# Patient Record
Sex: FEMALE | Race: OTHER | NOT HISPANIC OR LATINO | URBAN - METROPOLITAN AREA
[De-identification: names, ages, dates, MRNs, and addresses within clinical notes are randomized per-mention and may not be internally consistent; named-entity substitution may affect disease eponyms.]

---

## 2017-04-01 ENCOUNTER — INPATIENT (INPATIENT)
Facility: HOSPITAL | Age: 22
LOS: 1 days | Discharge: HOME | End: 2017-04-03
Attending: OBSTETRICS & GYNECOLOGY | Admitting: OBSTETRICS & GYNECOLOGY
Payer: MEDICAID

## 2017-04-01 VITALS
RESPIRATION RATE: 18 BRPM | TEMPERATURE: 98 F | HEART RATE: 86 BPM | DIASTOLIC BLOOD PRESSURE: 55 MMHG | SYSTOLIC BLOOD PRESSURE: 99 MMHG

## 2017-04-01 LAB
BASOPHILS # BLD AUTO: 0.01 K/UL — SIGNIFICANT CHANGE UP (ref 0–0.2)
BASOPHILS NFR BLD AUTO: 0.1 % — SIGNIFICANT CHANGE UP (ref 0–2)
BLD GP AB SCN SERPL QL: NEGATIVE — SIGNIFICANT CHANGE UP
EOSINOPHIL # BLD AUTO: 0.01 K/UL — SIGNIFICANT CHANGE UP (ref 0–0.5)
EOSINOPHIL NFR BLD AUTO: 0.1 % — SIGNIFICANT CHANGE UP (ref 0–6)
HCT VFR BLD CALC: 30.7 % — LOW (ref 34.5–45)
HGB BLD-MCNC: 9.8 G/DL — LOW (ref 11.5–15.5)
IMM GRANULOCYTES NFR BLD AUTO: 0.6 % — SIGNIFICANT CHANGE UP (ref 0–1.5)
LYMPHOCYTES # BLD AUTO: 0.93 K/UL — LOW (ref 1–3.3)
LYMPHOCYTES # BLD AUTO: 6.6 % — LOW (ref 13–44)
MCHC RBC-ENTMCNC: 25.5 PG — LOW (ref 27–34)
MCHC RBC-ENTMCNC: 31.9 % — LOW (ref 32–36)
MCV RBC AUTO: 79.9 FL — LOW (ref 80–100)
MONOCYTES # BLD AUTO: 0.43 K/UL — SIGNIFICANT CHANGE UP (ref 0–0.9)
MONOCYTES NFR BLD AUTO: 3.1 % — SIGNIFICANT CHANGE UP (ref 2–14)
NEUTROPHILS # BLD AUTO: 12.52 K/UL — HIGH (ref 1.8–7.4)
NEUTROPHILS NFR BLD AUTO: 89.5 % — HIGH (ref 43–77)
PLATELET # BLD AUTO: 297 K/UL — SIGNIFICANT CHANGE UP (ref 150–400)
PMV BLD: 10.2 FL — SIGNIFICANT CHANGE UP (ref 7–13)
RBC # BLD: 3.84 M/UL — SIGNIFICANT CHANGE UP (ref 3.8–5.2)
RBC # FLD: 16.1 % — HIGH (ref 10.3–14.5)
RH IG SCN BLD-IMP: POSITIVE — SIGNIFICANT CHANGE UP
WBC # BLD: 13.99 K/UL — HIGH (ref 3.8–10.5)
WBC # FLD AUTO: 13.99 K/UL — HIGH (ref 3.8–10.5)

## 2017-04-01 PROCEDURE — 59409 OBSTETRICAL CARE: CPT | Mod: U8,UB

## 2017-04-01 RX ORDER — PRAMOXINE HYDROCHLORIDE 150 MG/15G
1 AEROSOL, FOAM RECTAL EVERY 4 HOURS
Qty: 0 | Refills: 0 | Status: DISCONTINUED | OUTPATIENT
Start: 2017-04-01 | End: 2017-04-01

## 2017-04-01 RX ORDER — SODIUM CHLORIDE 9 MG/ML
3 INJECTION INTRAMUSCULAR; INTRAVENOUS; SUBCUTANEOUS EVERY 8 HOURS
Qty: 0 | Refills: 0 | Status: DISCONTINUED | OUTPATIENT
Start: 2017-04-01 | End: 2017-04-03

## 2017-04-01 RX ORDER — OXYTOCIN 10 UNIT/ML
333.33 VIAL (ML) INJECTION
Qty: 20 | Refills: 0 | Status: DISCONTINUED | OUTPATIENT
Start: 2017-04-01 | End: 2017-04-02

## 2017-04-01 RX ORDER — DIBUCAINE 1 %
1 OINTMENT (GRAM) RECTAL EVERY 4 HOURS
Qty: 0 | Refills: 0 | Status: DISCONTINUED | OUTPATIENT
Start: 2017-04-01 | End: 2017-04-01

## 2017-04-01 RX ORDER — DOCUSATE SODIUM 100 MG
100 CAPSULE ORAL
Qty: 0 | Refills: 0 | Status: DISCONTINUED | OUTPATIENT
Start: 2017-04-01 | End: 2017-04-03

## 2017-04-01 RX ORDER — PRAMOXINE HYDROCHLORIDE 150 MG/15G
1 AEROSOL, FOAM RECTAL EVERY 4 HOURS
Qty: 0 | Refills: 0 | Status: DISCONTINUED | OUTPATIENT
Start: 2017-04-01 | End: 2017-04-02

## 2017-04-01 RX ORDER — OXYTOCIN 10 UNIT/ML
10 VIAL (ML) INJECTION ONCE
Qty: 0 | Refills: 0 | Status: COMPLETED | OUTPATIENT
Start: 2017-04-01 | End: 2017-04-01

## 2017-04-01 RX ORDER — ACETAMINOPHEN 500 MG
975 TABLET ORAL EVERY 6 HOURS
Qty: 0 | Refills: 0 | Status: DISCONTINUED | OUTPATIENT
Start: 2017-04-01 | End: 2017-04-03

## 2017-04-01 RX ORDER — HYDROCORTISONE 1 %
1 OINTMENT (GRAM) TOPICAL EVERY 4 HOURS
Qty: 0 | Refills: 0 | Status: DISCONTINUED | OUTPATIENT
Start: 2017-04-01 | End: 2017-04-02

## 2017-04-01 RX ORDER — DIPHENHYDRAMINE HCL 50 MG
25 CAPSULE ORAL EVERY 6 HOURS
Qty: 0 | Refills: 0 | Status: DISCONTINUED | OUTPATIENT
Start: 2017-04-01 | End: 2017-04-03

## 2017-04-01 RX ORDER — TETANUS TOXOID, REDUCED DIPHTHERIA TOXOID AND ACELLULAR PERTUSSIS VACCINE, ADSORBED 5; 2.5; 8; 8; 2.5 [IU]/.5ML; [IU]/.5ML; UG/.5ML; UG/.5ML; UG/.5ML
0.5 SUSPENSION INTRAMUSCULAR ONCE
Qty: 0 | Refills: 0 | Status: DISCONTINUED | OUTPATIENT
Start: 2017-04-01 | End: 2017-04-03

## 2017-04-01 RX ORDER — OXYTOCIN 10 UNIT/ML
333.33 VIAL (ML) INJECTION
Qty: 20 | Refills: 0 | Status: COMPLETED | OUTPATIENT
Start: 2017-04-01

## 2017-04-01 RX ORDER — OXYCODONE HYDROCHLORIDE 5 MG/1
5 TABLET ORAL
Qty: 0 | Refills: 0 | Status: DISCONTINUED | OUTPATIENT
Start: 2017-04-01 | End: 2017-04-03

## 2017-04-01 RX ORDER — LANOLIN
1 OINTMENT (GRAM) TOPICAL EVERY 6 HOURS
Qty: 0 | Refills: 0 | Status: DISCONTINUED | OUTPATIENT
Start: 2017-04-01 | End: 2017-04-03

## 2017-04-01 RX ORDER — DIBUCAINE 1 %
1 OINTMENT (GRAM) RECTAL EVERY 4 HOURS
Qty: 0 | Refills: 0 | Status: DISCONTINUED | OUTPATIENT
Start: 2017-04-01 | End: 2017-04-03

## 2017-04-01 RX ORDER — MAGNESIUM HYDROXIDE 400 MG/1
30 TABLET, CHEWABLE ORAL
Qty: 0 | Refills: 0 | Status: DISCONTINUED | OUTPATIENT
Start: 2017-04-01 | End: 2017-04-03

## 2017-04-01 RX ORDER — OXYTOCIN 10 UNIT/ML
41.67 VIAL (ML) INJECTION
Qty: 20 | Refills: 0 | Status: DISCONTINUED | OUTPATIENT
Start: 2017-04-01 | End: 2017-04-02

## 2017-04-01 RX ORDER — AER TRAVELER 0.5 G/1
1 SOLUTION RECTAL; TOPICAL EVERY 4 HOURS
Qty: 0 | Refills: 0 | Status: DISCONTINUED | OUTPATIENT
Start: 2017-04-01 | End: 2017-04-03

## 2017-04-01 RX ORDER — HYDROCORTISONE 1 %
1 OINTMENT (GRAM) TOPICAL EVERY 4 HOURS
Qty: 0 | Refills: 0 | Status: DISCONTINUED | OUTPATIENT
Start: 2017-04-01 | End: 2017-04-01

## 2017-04-01 RX ORDER — OXYTOCIN 10 UNIT/ML
41.67 VIAL (ML) INJECTION
Qty: 20 | Refills: 0 | Status: DISCONTINUED | OUTPATIENT
Start: 2017-04-01 | End: 2017-04-01

## 2017-04-01 RX ORDER — OXYCODONE HYDROCHLORIDE 5 MG/1
5 TABLET ORAL EVERY 4 HOURS
Qty: 0 | Refills: 0 | Status: DISCONTINUED | OUTPATIENT
Start: 2017-04-01 | End: 2017-04-03

## 2017-04-01 RX ORDER — SIMETHICONE 80 MG/1
80 TABLET, CHEWABLE ORAL EVERY 6 HOURS
Qty: 0 | Refills: 0 | Status: DISCONTINUED | OUTPATIENT
Start: 2017-04-01 | End: 2017-04-03

## 2017-04-01 RX ORDER — GLYCERIN ADULT
1 SUPPOSITORY, RECTAL RECTAL AT BEDTIME
Qty: 0 | Refills: 0 | Status: DISCONTINUED | OUTPATIENT
Start: 2017-04-01 | End: 2017-04-03

## 2017-04-01 RX ORDER — SODIUM CHLORIDE 9 MG/ML
3 INJECTION INTRAMUSCULAR; INTRAVENOUS; SUBCUTANEOUS EVERY 8 HOURS
Qty: 0 | Refills: 0 | Status: DISCONTINUED | OUTPATIENT
Start: 2017-04-01 | End: 2017-04-01

## 2017-04-01 RX ORDER — SODIUM CHLORIDE 9 MG/ML
1000 INJECTION, SOLUTION INTRAVENOUS ONCE
Qty: 0 | Refills: 0 | Status: DISCONTINUED | OUTPATIENT
Start: 2017-04-01 | End: 2017-04-01

## 2017-04-01 RX ORDER — SODIUM CHLORIDE 9 MG/ML
1000 INJECTION, SOLUTION INTRAVENOUS
Qty: 0 | Refills: 0 | Status: DISCONTINUED | OUTPATIENT
Start: 2017-04-01 | End: 2017-04-01

## 2017-04-01 RX ORDER — AER TRAVELER 0.5 G/1
1 SOLUTION RECTAL; TOPICAL EVERY 4 HOURS
Qty: 0 | Refills: 0 | Status: DISCONTINUED | OUTPATIENT
Start: 2017-04-01 | End: 2017-04-01

## 2017-04-01 RX ADMIN — Medication 975 MILLIGRAM(S): at 14:38

## 2017-04-01 RX ADMIN — Medication 975 MILLIGRAM(S): at 20:20

## 2017-04-01 RX ADMIN — Medication 975 MILLIGRAM(S): at 21:00

## 2017-04-01 RX ADMIN — Medication 10 UNIT(S): at 13:00

## 2017-04-02 LAB — T PALLIDUM AB TITR SER: NEGATIVE — SIGNIFICANT CHANGE UP

## 2017-04-02 RX ORDER — PRAMOXINE HYDROCHLORIDE 150 MG/15G
1 AEROSOL, FOAM RECTAL EVERY 4 HOURS
Qty: 0 | Refills: 0 | Status: DISCONTINUED | OUTPATIENT
Start: 2017-04-02 | End: 2017-04-03

## 2017-04-02 RX ORDER — LEVOTHYROXINE SODIUM 125 MCG
75 TABLET ORAL DAILY
Qty: 0 | Refills: 0 | Status: DISCONTINUED | OUTPATIENT
Start: 2017-04-02 | End: 2017-04-03

## 2017-04-02 RX ORDER — HYDROCORTISONE 1 %
1 OINTMENT (GRAM) TOPICAL EVERY 4 HOURS
Qty: 0 | Refills: 0 | Status: DISCONTINUED | OUTPATIENT
Start: 2017-04-02 | End: 2017-04-03

## 2017-04-02 RX ADMIN — Medication 975 MILLIGRAM(S): at 09:20

## 2017-04-02 RX ADMIN — Medication 75 MICROGRAM(S): at 06:35

## 2017-04-02 RX ADMIN — Medication 975 MILLIGRAM(S): at 02:33

## 2017-04-02 RX ADMIN — Medication 975 MILLIGRAM(S): at 02:00

## 2017-04-02 RX ADMIN — Medication 1 TABLET(S): at 09:21

## 2017-04-02 RX ADMIN — Medication 975 MILLIGRAM(S): at 22:12

## 2017-04-02 RX ADMIN — Medication 975 MILLIGRAM(S): at 10:20

## 2017-04-02 RX ADMIN — Medication 975 MILLIGRAM(S): at 22:40

## 2017-04-03 VITALS
OXYGEN SATURATION: 99 % | SYSTOLIC BLOOD PRESSURE: 92 MMHG | RESPIRATION RATE: 18 BRPM | DIASTOLIC BLOOD PRESSURE: 66 MMHG | HEART RATE: 94 BPM | TEMPERATURE: 98 F

## 2017-04-03 RX ORDER — ACETAMINOPHEN 500 MG
3 TABLET ORAL
Qty: 0 | Refills: 0 | COMMUNITY
Start: 2017-04-03

## 2017-04-03 RX ORDER — MEDROXYPROGESTERONE ACETATE 150 MG/ML
150 INJECTION, SUSPENSION, EXTENDED RELEASE INTRAMUSCULAR ONCE
Qty: 0 | Refills: 0 | Status: COMPLETED | OUTPATIENT
Start: 2017-04-03 | End: 2017-04-03

## 2017-04-03 RX ADMIN — MEDROXYPROGESTERONE ACETATE 150 MILLIGRAM(S): 150 INJECTION, SUSPENSION, EXTENDED RELEASE INTRAMUSCULAR at 08:52

## 2017-04-03 RX ADMIN — Medication 75 MICROGRAM(S): at 05:49

## 2017-04-03 RX ADMIN — Medication 975 MILLIGRAM(S): at 05:10

## 2017-04-03 RX ADMIN — Medication 975 MILLIGRAM(S): at 05:51

## 2017-04-03 NOTE — DISCHARGE NOTE OB - PLAN OF CARE
recovery Regular diet.  Resume normal activity as tolerated. Follow up at [facility] in 6 weeks.  No heavy lifting, driving, or strenuous activity for 6 weeks.  Nothing per vagina such as tampons, intercourse, douches or tub baths for 6 weeks or until you see your doctor.  Call your doctor with any signs and symptoms of infection such as fever, chills, nausea or vomiting.  Call your doctor if you're unable to tolerate food, increase in vaginal bleeding or have difficulty urinating.  Call your doctor if you have pain that is not relieved by your prescribed medications.  Notify your doctor with any other concerns.

## 2017-04-03 NOTE — DISCHARGE NOTE OB - CARE PLAN
Principal Discharge DX:	Vaginal delivery  Goal:	recovery  Instructions for follow-up, activity and diet:	Regular diet.  Resume normal activity as tolerated. Follow up at [facility] in 6 weeks.  No heavy lifting, driving, or strenuous activity for 6 weeks.  Nothing per vagina such as tampons, intercourse, douches or tub baths for 6 weeks or until you see your doctor.  Call your doctor with any signs and symptoms of infection such as fever, chills, nausea or vomiting.  Call your doctor if you're unable to tolerate food, increase in vaginal bleeding or have difficulty urinating.  Call your doctor if you have pain that is not relieved by your prescribed medications.  Notify your doctor with any other concerns.

## 2017-04-03 NOTE — DISCHARGE NOTE OB - MATERIALS PROVIDED
Vaccinations/[facility] Hearing Screen Program/Shaken Baby Prevention Handout/[facility]  Screening Program/Tdap Vaccination (VIS Pub Date: 2012)/[place]  Immunization Record/Back To Sleep Handout/Guide to Postpartum Care

## 2017-04-03 NOTE — DISCHARGE NOTE OB - MEDICATION SUMMARY - MEDICATIONS TO TAKE
I will START or STAY ON the medications listed below when I get home from the hospital:    acetaminophen 325 mg oral tablet  -- 3 tab(s) by mouth every 6 hours  -- Indication: For pain

## 2017-04-03 NOTE — DISCHARGE NOTE OB - HOSPITAL COURSE
Uncomplicated vaginal delivery. EBL. 200 The patient met all appropriate post partum milestones.  The patient was able to void, tolerated a regular diet, and ambulated on her own.  The patient was discharged on PPD#2 afebrile, with stable vital signs, and appropriate pain control.

## 2018-09-07 ENCOUNTER — INPATIENT (INPATIENT)
Facility: HOSPITAL | Age: 23
LOS: 1 days | Discharge: HOME | End: 2018-09-09
Attending: OBSTETRICS & GYNECOLOGY | Admitting: OBSTETRICS & GYNECOLOGY
Payer: MEDICAID

## 2018-09-07 VITALS
HEART RATE: 90 BPM | DIASTOLIC BLOOD PRESSURE: 59 MMHG | RESPIRATION RATE: 16 BRPM | SYSTOLIC BLOOD PRESSURE: 104 MMHG | OXYGEN SATURATION: 100 %

## 2018-09-07 DIAGNOSIS — Z3A.00 WEEKS OF GESTATION OF PREGNANCY NOT SPECIFIED: ICD-10-CM

## 2018-09-07 DIAGNOSIS — O26.899 OTHER SPECIFIED PREGNANCY RELATED CONDITIONS, UNSPECIFIED TRIMESTER: ICD-10-CM

## 2018-09-07 LAB
APTT BLD: 25.8 SEC — LOW (ref 27.5–37.4)
BASOPHILS # BLD AUTO: 0.1 K/UL — SIGNIFICANT CHANGE UP (ref 0–0.2)
BASOPHILS NFR BLD AUTO: 0.9 % — SIGNIFICANT CHANGE UP (ref 0–2)
EOSINOPHIL # BLD AUTO: 0.1 K/UL — SIGNIFICANT CHANGE UP (ref 0–0.5)
EOSINOPHIL NFR BLD AUTO: 0.8 % — SIGNIFICANT CHANGE UP (ref 0–6)
FIBRINOGEN PPP-MCNC: 571 MG/DL — HIGH (ref 310–510)
HCT VFR BLD CALC: 37.4 % — SIGNIFICANT CHANGE UP (ref 34.5–45)
HGB BLD-MCNC: 12.1 G/DL — SIGNIFICANT CHANGE UP (ref 11.5–15.5)
INR BLD: 0.99 RATIO — SIGNIFICANT CHANGE UP (ref 0.88–1.16)
LYMPHOCYTES # BLD AUTO: 25.2 % — SIGNIFICANT CHANGE UP (ref 13–44)
LYMPHOCYTES # BLD AUTO: 3.1 K/UL — SIGNIFICANT CHANGE UP (ref 1–3.3)
MCHC RBC-ENTMCNC: 29 PG — SIGNIFICANT CHANGE UP (ref 27–34)
MCHC RBC-ENTMCNC: 32.3 GM/DL — SIGNIFICANT CHANGE UP (ref 32–36)
MCV RBC AUTO: 89.6 FL — SIGNIFICANT CHANGE UP (ref 80–100)
MONOCYTES # BLD AUTO: 0.8 K/UL — SIGNIFICANT CHANGE UP (ref 0–0.9)
MONOCYTES NFR BLD AUTO: 6.5 % — SIGNIFICANT CHANGE UP (ref 2–14)
NEUTROPHILS # BLD AUTO: 8.1 K/UL — HIGH (ref 1.8–7.4)
NEUTROPHILS NFR BLD AUTO: 66.5 % — SIGNIFICANT CHANGE UP (ref 43–77)
PLATELET # BLD AUTO: 265 K/UL — SIGNIFICANT CHANGE UP (ref 150–400)
PROTHROM AB SERPL-ACNC: 10.8 SEC — SIGNIFICANT CHANGE UP (ref 9.8–12.7)
RBC # BLD: 4.18 M/UL — SIGNIFICANT CHANGE UP (ref 3.8–5.2)
RBC # FLD: 14.4 % — SIGNIFICANT CHANGE UP (ref 10.3–14.5)
T PALLIDUM AB TITR SER: NEGATIVE — SIGNIFICANT CHANGE UP
WBC # BLD: 12.2 K/UL — HIGH (ref 3.8–10.5)
WBC # FLD AUTO: 12.2 K/UL — HIGH (ref 3.8–10.5)

## 2018-09-07 RX ORDER — CITRIC ACID/SODIUM CITRATE 300-500 MG
15 SOLUTION, ORAL ORAL EVERY 4 HOURS
Qty: 0 | Refills: 0 | Status: DISCONTINUED | OUTPATIENT
Start: 2018-09-07 | End: 2018-09-07

## 2018-09-07 RX ORDER — DIPHENHYDRAMINE HCL 50 MG
25 CAPSULE ORAL EVERY 6 HOURS
Qty: 0 | Refills: 0 | Status: DISCONTINUED | OUTPATIENT
Start: 2018-09-07 | End: 2018-09-09

## 2018-09-07 RX ORDER — LANOLIN
1 OINTMENT (GRAM) TOPICAL EVERY 6 HOURS
Qty: 0 | Refills: 0 | Status: DISCONTINUED | OUTPATIENT
Start: 2018-09-07 | End: 2018-09-09

## 2018-09-07 RX ORDER — TETANUS TOXOID, REDUCED DIPHTHERIA TOXOID AND ACELLULAR PERTUSSIS VACCINE, ADSORBED 5; 2.5; 8; 8; 2.5 [IU]/.5ML; [IU]/.5ML; UG/.5ML; UG/.5ML; UG/.5ML
0.5 SUSPENSION INTRAMUSCULAR ONCE
Qty: 0 | Refills: 0 | Status: DISCONTINUED | OUTPATIENT
Start: 2018-09-07 | End: 2018-09-09

## 2018-09-07 RX ORDER — SIMETHICONE 80 MG/1
80 TABLET, CHEWABLE ORAL EVERY 6 HOURS
Qty: 0 | Refills: 0 | Status: DISCONTINUED | OUTPATIENT
Start: 2018-09-07 | End: 2018-09-09

## 2018-09-07 RX ORDER — ZOLPIDEM TARTRATE 10 MG/1
5 TABLET ORAL AT BEDTIME
Qty: 0 | Refills: 0 | Status: DISCONTINUED | OUTPATIENT
Start: 2018-09-07 | End: 2018-09-09

## 2018-09-07 RX ORDER — FERROUS SULFATE 325(65) MG
325 TABLET ORAL DAILY
Qty: 0 | Refills: 0 | Status: DISCONTINUED | OUTPATIENT
Start: 2018-09-07 | End: 2018-09-09

## 2018-09-07 RX ORDER — OXYCODONE AND ACETAMINOPHEN 5; 325 MG/1; MG/1
2 TABLET ORAL EVERY 6 HOURS
Qty: 0 | Refills: 0 | Status: DISCONTINUED | OUTPATIENT
Start: 2018-09-07 | End: 2018-09-09

## 2018-09-07 RX ORDER — SODIUM CHLORIDE 9 MG/ML
1000 INJECTION, SOLUTION INTRAVENOUS
Qty: 0 | Refills: 0 | Status: DISCONTINUED | OUTPATIENT
Start: 2018-09-07 | End: 2018-09-07

## 2018-09-07 RX ORDER — AER TRAVELER 0.5 G/1
1 SOLUTION RECTAL; TOPICAL EVERY 4 HOURS
Qty: 0 | Refills: 0 | Status: DISCONTINUED | OUTPATIENT
Start: 2018-09-07 | End: 2018-09-09

## 2018-09-07 RX ORDER — HYDROCORTISONE 1 %
1 OINTMENT (GRAM) TOPICAL EVERY 4 HOURS
Qty: 0 | Refills: 0 | Status: DISCONTINUED | OUTPATIENT
Start: 2018-09-07 | End: 2018-09-09

## 2018-09-07 RX ORDER — IBUPROFEN 200 MG
600 TABLET ORAL EVERY 6 HOURS
Qty: 0 | Refills: 0 | Status: DISCONTINUED | OUTPATIENT
Start: 2018-09-07 | End: 2018-09-09

## 2018-09-07 RX ORDER — DIBUCAINE 1 %
1 OINTMENT (GRAM) RECTAL EVERY 4 HOURS
Qty: 0 | Refills: 0 | Status: DISCONTINUED | OUTPATIENT
Start: 2018-09-07 | End: 2018-09-09

## 2018-09-07 RX ORDER — SODIUM CHLORIDE 9 MG/ML
3 INJECTION INTRAMUSCULAR; INTRAVENOUS; SUBCUTANEOUS EVERY 8 HOURS
Qty: 0 | Refills: 0 | Status: DISCONTINUED | OUTPATIENT
Start: 2018-09-07 | End: 2018-09-09

## 2018-09-07 RX ORDER — OXYTOCIN 10 UNIT/ML
333.33 VIAL (ML) INJECTION
Qty: 20 | Refills: 0 | Status: DISCONTINUED | OUTPATIENT
Start: 2018-09-07 | End: 2018-09-07

## 2018-09-07 RX ORDER — DOCUSATE SODIUM 100 MG
100 CAPSULE ORAL
Qty: 0 | Refills: 0 | Status: DISCONTINUED | OUTPATIENT
Start: 2018-09-07 | End: 2018-09-09

## 2018-09-07 RX ORDER — SODIUM CHLORIDE 9 MG/ML
1000 INJECTION, SOLUTION INTRAVENOUS ONCE
Qty: 0 | Refills: 0 | Status: DISCONTINUED | OUTPATIENT
Start: 2018-09-07 | End: 2018-09-07

## 2018-09-07 RX ORDER — BENZOCAINE 10 %
1 GEL (GRAM) MUCOUS MEMBRANE EVERY 6 HOURS
Qty: 0 | Refills: 0 | Status: DISCONTINUED | OUTPATIENT
Start: 2018-09-07 | End: 2018-09-09

## 2018-09-07 RX ORDER — ACETAMINOPHEN 500 MG
650 TABLET ORAL EVERY 6 HOURS
Qty: 0 | Refills: 0 | Status: DISCONTINUED | OUTPATIENT
Start: 2018-09-07 | End: 2018-09-09

## 2018-09-07 RX ORDER — OXYTOCIN 10 UNIT/ML
41.67 VIAL (ML) INJECTION
Qty: 20 | Refills: 0 | Status: DISCONTINUED | OUTPATIENT
Start: 2018-09-07 | End: 2018-09-09

## 2018-09-07 RX ORDER — PRAMOXINE HYDROCHLORIDE 150 MG/15G
1 AEROSOL, FOAM RECTAL EVERY 4 HOURS
Qty: 0 | Refills: 0 | Status: DISCONTINUED | OUTPATIENT
Start: 2018-09-07 | End: 2018-09-09

## 2018-09-07 RX ADMIN — Medication 600 MILLIGRAM(S): at 17:29

## 2018-09-07 RX ADMIN — Medication 100 MILLIGRAM(S): at 17:29

## 2018-09-07 RX ADMIN — Medication 600 MILLIGRAM(S): at 23:28

## 2018-09-07 RX ADMIN — SODIUM CHLORIDE 3 MILLILITER(S): 9 INJECTION INTRAMUSCULAR; INTRAVENOUS; SUBCUTANEOUS at 14:48

## 2018-09-07 RX ADMIN — Medication 600 MILLIGRAM(S): at 18:08

## 2018-09-07 RX ADMIN — SODIUM CHLORIDE 3 MILLILITER(S): 9 INJECTION INTRAMUSCULAR; INTRAVENOUS; SUBCUTANEOUS at 22:31

## 2018-09-08 ENCOUNTER — TRANSCRIPTION ENCOUNTER (OUTPATIENT)
Age: 23
End: 2018-09-08

## 2018-09-08 LAB
BASOPHILS # BLD AUTO: 0.1 K/UL — SIGNIFICANT CHANGE UP (ref 0–0.2)
BASOPHILS NFR BLD AUTO: 0.5 % — SIGNIFICANT CHANGE UP (ref 0–2)
EOSINOPHIL # BLD AUTO: 0.1 K/UL — SIGNIFICANT CHANGE UP (ref 0–0.5)
EOSINOPHIL NFR BLD AUTO: 1 % — SIGNIFICANT CHANGE UP (ref 0–6)
HCT VFR BLD CALC: 34.5 % — SIGNIFICANT CHANGE UP (ref 34.5–45)
HGB BLD-MCNC: 11.1 G/DL — LOW (ref 11.5–15.5)
LYMPHOCYTES # BLD AUTO: 22.1 % — SIGNIFICANT CHANGE UP (ref 13–44)
LYMPHOCYTES # BLD AUTO: 3.1 K/UL — SIGNIFICANT CHANGE UP (ref 1–3.3)
MCHC RBC-ENTMCNC: 28.8 PG — SIGNIFICANT CHANGE UP (ref 27–34)
MCHC RBC-ENTMCNC: 32.2 GM/DL — SIGNIFICANT CHANGE UP (ref 32–36)
MCV RBC AUTO: 89.6 FL — SIGNIFICANT CHANGE UP (ref 80–100)
MONOCYTES # BLD AUTO: 1.1 K/UL — HIGH (ref 0–0.9)
MONOCYTES NFR BLD AUTO: 7.8 % — SIGNIFICANT CHANGE UP (ref 2–14)
NEUTROPHILS # BLD AUTO: 9.6 K/UL — HIGH (ref 1.8–7.4)
NEUTROPHILS NFR BLD AUTO: 68.6 % — SIGNIFICANT CHANGE UP (ref 43–77)
PLATELET # BLD AUTO: 232 K/UL — SIGNIFICANT CHANGE UP (ref 150–400)
RBC # BLD: 3.85 M/UL — SIGNIFICANT CHANGE UP (ref 3.8–5.2)
RBC # FLD: 14.2 % — SIGNIFICANT CHANGE UP (ref 10.3–14.5)
WBC # BLD: 14 K/UL — HIGH (ref 3.8–10.5)
WBC # FLD AUTO: 14 K/UL — HIGH (ref 3.8–10.5)

## 2018-09-08 RX ORDER — LEVOTHYROXINE SODIUM 125 MCG
50 TABLET ORAL DAILY
Qty: 0 | Refills: 0 | Status: DISCONTINUED | OUTPATIENT
Start: 2018-09-08 | End: 2018-09-09

## 2018-09-08 RX ADMIN — Medication 600 MILLIGRAM(S): at 15:01

## 2018-09-08 RX ADMIN — Medication 50 MICROGRAM(S): at 10:21

## 2018-09-08 RX ADMIN — Medication 1 TABLET(S): at 12:22

## 2018-09-08 RX ADMIN — SODIUM CHLORIDE 3 MILLILITER(S): 9 INJECTION INTRAMUSCULAR; INTRAVENOUS; SUBCUTANEOUS at 05:51

## 2018-09-08 RX ADMIN — Medication 325 MILLIGRAM(S): at 12:22

## 2018-09-08 RX ADMIN — Medication 600 MILLIGRAM(S): at 00:10

## 2018-09-08 NOTE — DISCHARGE NOTE OB - PATIENT PORTAL LINK FT
You can access the [facility] Patient Portal, offered by [facility], by registering with the following website: [email or web address]

## 2018-09-08 NOTE — DISCHARGE NOTE OB - CARE PROVIDER_API CALL
Carmita Henderson), Obstetrics and Gynecology  [address] [place] [address]  Phone: [phone]  Fax: [phone]

## 2018-09-08 NOTE — DISCHARGE NOTE OB - CARE PLAN
Principal Discharge DX:	 (normal spontaneous vaginal delivery)  Goal:	postpartum care  Assessment and plan of treatment:	nothing in the vagina for 6 weeks. No heavy lifting. Regular diet. Follow up with your OB in 5 weeks.

## 2018-09-08 NOTE — DISCHARGE NOTE OB - MATERIALS PROVIDED
Discharge Medication Information for Patients and Families Pocket Guide/[facility] Hearing Screen Program/Birth Certificate Instructions/Breastfeeding Log/Tdap Vaccination (VIS Pub Date: 2012)/  Immunization Record/Shaken Baby Prevention Handout/Breastfeeding Guide and Packet/Vaccinations/Breastfeeding Mother’s Support Group Information/Guide to Postpartum Care/[facility] [place] Screening Program

## 2018-09-08 NOTE — DISCHARGE NOTE OB - PLAN OF CARE
postpartum care nothing in the vagina for 6 weeks. No heavy lifting. Regular diet. Follow up with your OB in 5 weeks.

## 2018-09-08 NOTE — PROGRESS NOTE ADULT - SUBJECTIVE AND OBJECTIVE BOX
Patient evaluated at bedside, offers no acute complaints.  Resting comfortably with baby at bedside.  Tolerating regular diet, Voiding without difficulty; +flatus, -BM  Currently breast and bottlefeeding.  Denies fever/chills, nausea/vomiting, headache, dizziness, chest pains, shortness of breath, palpitations    Vital Signs Last 24 Hrs  T(C): 36.8 (08 Sep 2018 05:49), Max: 37.1 (07 Sep 2018 13:22)  T(F): 98.2 (08 Sep 2018 05:49), Max: 98.8 (07 Sep 2018 13:22)  HR: 77 (08 Sep 2018 05:49) (72 - 77)  BP: 104/69 (08 Sep 2018 05:49) (97/52 - 108/64)  BP(mean): --  RR: 16 (08 Sep 2018 05:49) (16 - 16)  SpO2: 99% (08 Sep 2018 05:49) (98% - 99%)    PE: Pt appears comfortable, NAD, AAOx3  Chest: CTA bilaterally, no W/R/R  Cardiac: RRR  Breasts: soft, NT/nonengorged bilaterally; no masses, no erythema  Abd: soft; NT; no guarding or rebound; +BS x4 quad; Fundus firm NT below umbilicus  Gyn: moderate lochia  Ext: soft, NT; DTRs 2+ bilaterally; no worsening edema                          11.1   14.0  )-----------( 232      ( 08 Sep 2018 06:37 )             34.5

## 2018-09-08 NOTE — DISCHARGE NOTE OB - FINDINGS/TREATMENT
nothing in the vagina for 6 weeks. No heavy lifting. Regular diet. Follow up with your OB in 5 weeks.

## 2018-09-09 VITALS
SYSTOLIC BLOOD PRESSURE: 103 MMHG | RESPIRATION RATE: 16 BRPM | OXYGEN SATURATION: 98 % | DIASTOLIC BLOOD PRESSURE: 71 MMHG | HEART RATE: 78 BPM | TEMPERATURE: 99 F

## 2018-09-09 PROCEDURE — 85610 PROTHROMBIN TIME: CPT

## 2018-09-09 PROCEDURE — 86901 BLOOD TYPING SEROLOGIC RH(D): CPT

## 2018-09-09 PROCEDURE — 59050 FETAL MONITOR W/REPORT: CPT

## 2018-09-09 PROCEDURE — 86780 TREPONEMA PALLIDUM: CPT

## 2018-09-09 PROCEDURE — G0463: CPT

## 2018-09-09 PROCEDURE — 86850 RBC ANTIBODY SCREEN: CPT

## 2018-09-09 PROCEDURE — 85730 THROMBOPLASTIN TIME PARTIAL: CPT

## 2018-09-09 PROCEDURE — 85384 FIBRINOGEN ACTIVITY: CPT

## 2018-09-09 PROCEDURE — 59025 FETAL NON-STRESS TEST: CPT

## 2018-09-09 PROCEDURE — 86900 BLOOD TYPING SEROLOGIC ABO: CPT

## 2018-09-09 PROCEDURE — 85027 COMPLETE CBC AUTOMATED: CPT

## 2018-09-09 RX ORDER — IBUPROFEN 200 MG
1 TABLET ORAL
Qty: 20 | Refills: 0 | OUTPATIENT
Start: 2018-09-09 | End: 2018-09-13

## 2018-09-09 RX ORDER — DOCUSATE SODIUM 100 MG
1 CAPSULE ORAL
Qty: 60 | Refills: 0 | OUTPATIENT
Start: 2018-09-09 | End: 2018-10-08

## 2018-09-09 RX ADMIN — Medication 600 MILLIGRAM(S): at 03:50

## 2018-09-09 RX ADMIN — Medication 600 MILLIGRAM(S): at 03:13

## 2018-09-09 RX ADMIN — Medication 50 MICROGRAM(S): at 06:01

## 2018-09-09 NOTE — PROGRESS NOTE ADULT - PROBLEM SELECTOR PLAN 1
A/P:  PPD#2 s/p       - Discharge home with instructions  -Follow up in office in 5-6 weeks for postpartum visit  -Breastfeeding encouraged

## 2018-09-09 NOTE — PROGRESS NOTE ADULT - SUBJECTIVE AND OBJECTIVE BOX
Patient seen at bedside resting comfortably offers no current complaints.  Ambulating and voiding without difficulty.  Passing flatus and tolerating regular diet.  both breast/bottle feeding.  Denies HA, CP, SOB, N/V/D, dizziness, palpitations, worsening abdominal pain, worsening vaginal bleeding, or any other concerns.    Vital Signs Last 24 Hrs  T(C): 37 (09 Sep 2018 05:11), Max: 37 (09 Sep 2018 05:11)  T(F): 98.6 (09 Sep 2018 05:11), Max: 98.6 (09 Sep 2018 05:11)  HR: 78 (09 Sep 2018 05:11) (78 - 78)  BP: 103/71 (09 Sep 2018 05:11) (96/65 - 103/71)  BP(mean): --  RR: 16 (09 Sep 2018 05:11) (16 - 17)  SpO2: 98% (09 Sep 2018 05:11) (98% - 100%)    Physical Exam:     Gen: A&Ox 3, NAD  Breast: Soft, nontender, nonengorged  Abdomen: Soft, nontender, ND; Fundus firm below umbilicus  Gyn: Min lochia  Ext: Nontender,  no worsening edema                             11.1   14.0  )-----------( 232      ( 08 Sep 2018 06:37 )             34.5                      A/P:  PPD#2 s/p       - Discharge home with instructions  -Follow up in office in 5-6 weeks for postpartum visit  -Breastfeeding encouraged

## 2019-04-04 NOTE — PATIENT PROFILE OB - AMNIOTIC FLUID ODOR, LABOR
Initiate Treatment: Silvadene Cream, apply QD to affected area on finger under occlusion (local)
Detail Level: Simple
normal

## 2021-04-27 ENCOUNTER — APPOINTMENT (OUTPATIENT)
Dept: INTERNAL MEDICINE | Facility: CLINIC | Age: 26
End: 2021-04-27
Payer: MEDICAID

## 2021-04-27 VITALS — WEIGHT: 108 LBS | BODY MASS INDEX: 22.57 KG/M2

## 2021-04-27 VITALS
SYSTOLIC BLOOD PRESSURE: 100 MMHG | DIASTOLIC BLOOD PRESSURE: 67 MMHG | HEIGHT: 58 IN | RESPIRATION RATE: 16 BRPM | HEART RATE: 89 BPM | TEMPERATURE: 98.6 F | OXYGEN SATURATION: 97 %

## 2021-04-27 DIAGNOSIS — Z82.49 FAMILY HISTORY OF ISCHEMIC HEART DISEASE AND OTHER DISEASES OF THE CIRCULATORY SYSTEM: ICD-10-CM

## 2021-04-27 DIAGNOSIS — Z82.5 FAMILY HISTORY OF ASTHMA AND OTHER CHRONIC LOWER RESPIRATORY DISEASES: ICD-10-CM

## 2021-04-27 DIAGNOSIS — Z83.49 FAMILY HISTORY OF OTHER ENDOCRINE, NUTRITIONAL AND METABOLIC DISEASES: ICD-10-CM

## 2021-04-27 PROCEDURE — 99203 OFFICE O/P NEW LOW 30 MIN: CPT

## 2021-04-27 PROCEDURE — 99072 ADDL SUPL MATRL&STAF TM PHE: CPT

## 2021-04-27 NOTE — COUNSELING
[de-identified] : healthy eating,exercise [None] : None [Good understanding] : Patient has a good understanding of lifestyle changes and steps needed to achieve self management goal

## 2021-04-27 NOTE — HISTORY OF PRESENT ILLNESS
[FreeTextEntry1] : establish care [de-identified] : 27 yo asymptomatic pt.\par PMH: mild subclinical hypothyroidism during pregnancy,was Rxed with Levothyroxine 25 mcg until the bith of the child,than stopped.\par denies tiredness,dry skin,constipation.menses reg.

## 2021-04-27 NOTE — HEALTH RISK ASSESSMENT
[Good] : ~his/her~  mood as  good [] : No [No] : In the past 12 months have you used drugs other than those required for medical reasons? No [No falls in past year] : Patient reported no falls in the past year [0] : 2) Feeling down, depressed, or hopeless: Not at all (0) [[id number]] : 0 [Fully functional (bathing, dressing, toileting, transferring, walking, feeding)] : Fully functional (bathing, dressing, toileting, transferring, walking, feeding) [Fully functional (using the telephone, shopping, preparing meals, housekeeping, doing laundry, using] : Fully functional and needs no help or supervision to perform IADLs (using the telephone, shopping, preparing meals, housekeeping, doing laundry, using transportation, managing medications and managing finances) [Patient/Caregiver not ready to engage] : Patient/Caregiver not ready to engage

## 2021-04-28 ENCOUNTER — TRANSCRIPTION ENCOUNTER (OUTPATIENT)
Age: 26
End: 2021-04-28

## 2021-05-12 ENCOUNTER — NON-APPOINTMENT (OUTPATIENT)
Age: 26
End: 2021-05-12

## 2021-05-12 LAB
25(OH)D3 SERPL-MCNC: 22.1 NG/ML
ALBUMIN SERPL ELPH-MCNC: 4.3 G/DL
ALP BLD-CCNC: 54 U/L
ALT SERPL-CCNC: 8 U/L
ANION GAP SERPL CALC-SCNC: 10 MMOL/L
APPEARANCE: CLEAR
AST SERPL-CCNC: 15 U/L
BASOPHILS # BLD AUTO: 0.05 K/UL
BASOPHILS NFR BLD AUTO: 0.8 %
BILIRUB SERPL-MCNC: 0.2 MG/DL
BILIRUBIN URINE: NEGATIVE
BLOOD URINE: NEGATIVE
BUN SERPL-MCNC: 9 MG/DL
CALCIUM SERPL-MCNC: 9.4 MG/DL
CHLORIDE SERPL-SCNC: 103 MMOL/L
CHOLEST SERPL-MCNC: 154 MG/DL
CO2 SERPL-SCNC: 25 MMOL/L
COLOR: NORMAL
CREAT SERPL-MCNC: 0.71 MG/DL
EOSINOPHIL # BLD AUTO: 0.18 K/UL
EOSINOPHIL NFR BLD AUTO: 3.1 %
GLUCOSE QUALITATIVE U: NEGATIVE
GLUCOSE SERPL-MCNC: 92 MG/DL
HCT VFR BLD CALC: 36.1 %
HDLC SERPL-MCNC: 46 MG/DL
HGB BLD-MCNC: 11.6 G/DL
IMM GRANULOCYTES NFR BLD AUTO: 0.2 %
KETONES URINE: NEGATIVE
LDLC SERPL CALC-MCNC: 100 MG/DL
LEUKOCYTE ESTERASE URINE: NEGATIVE
LYMPHOCYTES # BLD AUTO: 2.02 K/UL
LYMPHOCYTES NFR BLD AUTO: 34.3 %
MAN DIFF?: NORMAL
MCHC RBC-ENTMCNC: 29.7 PG
MCHC RBC-ENTMCNC: 32.1 GM/DL
MCV RBC AUTO: 92.6 FL
MONOCYTES # BLD AUTO: 0.36 K/UL
MONOCYTES NFR BLD AUTO: 6.1 %
NEUTROPHILS # BLD AUTO: 3.27 K/UL
NEUTROPHILS NFR BLD AUTO: 55.5 %
NITRITE URINE: NEGATIVE
NONHDLC SERPL-MCNC: 108 MG/DL
PH URINE: 6
PLATELET # BLD AUTO: 368 K/UL
POTASSIUM SERPL-SCNC: 4.1 MMOL/L
PROT SERPL-MCNC: 7.3 G/DL
PROTEIN URINE: NEGATIVE
RBC # BLD: 3.9 M/UL
RBC # FLD: 14.1 %
SODIUM SERPL-SCNC: 138 MMOL/L
SPECIFIC GRAVITY URINE: 1.02
T3FREE SERPL-MCNC: 2.62 PG/ML
T4 FREE SERPL-MCNC: 1.1 NG/DL
THYROGLOB AB SERPL-ACNC: 232 IU/ML
THYROPEROXIDASE AB SERPL IA-ACNC: 50.4 IU/ML
TRIGL SERPL-MCNC: 40 MG/DL
TSH SERPL-ACNC: 12.6 UIU/ML
UROBILINOGEN URINE: NORMAL
WBC # FLD AUTO: 5.89 K/UL

## 2021-06-14 NOTE — PATIENT PROFILE OB - TRANSPORTATION AVAILABLE, PROFILE
"Ruthy Álvarez is a 53 y.o. female who is being evaluated via a billable telephone visit.      The patient has been notified of following:     \"This telephone visit will be conducted via a call between you and your physician/provider. We have found that certain health care needs can be provided without the need for a physical exam.  This service lets us provide the care you need with a short phone conversation.  If a prescription is necessary we can send it directly to your pharmacy.  If lab work is needed we can place an order for that and you can then stop by our lab to have the test done at a later time.    Telephone visits are billed at different rates depending on your insurance coverage. During this emergency period, for some insurers they may be billed the same as an in-person visit.  Please reach out to your insurance provider with any questions.    If during the course of the call the physician/provider feels a telephone visit is not appropriate, you will not be charged for this service.\"    Patient has given verbal consent to a Telephone visit? Yes    What phone number would you like to be contacted at? [phone]    Patient would like to receive their AVS by AVS Preference: E-Mail (Inform patient AVS not encrypted with this option).    Additional provider notes:    Bariatric Follow Up Visit with a History of Previous Bariatric Surgery     Date of visit: 12/31/2020  Physician: Vy Carson MD  Primary Care Provider:  Jose Angel Meyer MD Twaundalyn TJ Henri   53 y.o.  female    Date of Surgery: 2006 RYGB  Initial Weight: 360#  Initial BMI:   Today's Weight:   Wt Readings from Last 1 Encounters:   12/31/20 191 lb (86.6 kg)     Body mass index is 38.58 kg/m .      Assessment and Plan     Assessment: Ruthy is a 53 y.o. year old female who is 14 yrs s/p  Kim en Y Gastric Bypass with Dr. Meredith Bliss TJ Henri feels as if she has achieved the goals she hoped to accomplish through " bariatric surgery and weight loss.    Encounter Diagnoses   Name Primary?     Postoperative malabsorption Yes     Type 2 diabetes mellitus with hypoglycemia without coma, with long-term current use of insulin (H)      Stage 3a chronic kidney disease          Current Outpatient Medications:      ACCU-CHEK FASTCLIX LANCET DRUM, USE TO TEST BLOOD SUGARS QID, Disp: , Rfl: 0     ACCU-CHEK GUIDE GLUCOSE METER Misc, U UTD, Disp: , Rfl: 0     ACCU-CHEK GUIDE strips, CHECK GLUCOSE TID, Disp: , Rfl: 6     albuterol (PROAIR HFA;PROVENTIL HFA;VENTOLIN HFA) 90 mcg/actuation inhaler, Inhale 2 puffs every 4 (four) hours as needed for shortness of breath., Disp: , Rfl:      amitriptyline (ELAVIL) 10 MG tablet, Take 10 mg by mouth at bedtime., Disp: , Rfl:      atorvastatin (LIPITOR) 10 MG tablet, Take 10 mg by mouth daily.    , Disp: , Rfl:      cholecalciferol, vitamin D3, 1,000 unit tablet, Take 2,000 Units by mouth daily., Disp: , Rfl:      copper gluconate 2 mg cap, Take 1 capsule by mouth daily., Disp: , Rfl:      cyanocobalamin 1,000 mcg/mL injection, Inject 1,000 mcg into the shoulder, thigh, or buttocks every 28 days., Disp: , Rfl:      diclofenac sodium (VOLTAREN) 1 % Gel, Apply topically., Disp: , Rfl:      dulaglutide (TRULICITY) 1.5 mg/0.5 mL PnIj, Inject 1.5 mg under the skin every 7 days. Mondays , Disp: , Rfl:      DULoxetine (CYMBALTA) 60 MG capsule, Take 60 mg by mouth daily., Disp: , Rfl:      famotidine (PEPCID) 20 MG tablet, Take 20 mg by mouth 2 (two) times a day., Disp: , Rfl:      ferrous sulfate 325 (65 FE) MG EC tablet, Take 325 mg by mouth daily. , Disp: , Rfl:      fluticasone-salmeterol (ADVAIR HFA) 230-21 mcg/actuation inhaler, Inhale 2 puffs every evening.    , Disp: , Rfl:      furosemide (LASIX) 20 MG tablet, Take 20 mg by mouth daily as needed for other. swelling, Disp: , Rfl: 2     gabapentin (NEURONTIN) 250 mg/5 mL solution, Take 750 mg by mouth 3 (three) times a day., Disp: , Rfl:       "HYDROmorphone (DILAUDID) 2 MG tablet, Take 1-2 tablets (2-4 mg total) by mouth every 6 (six) hours as needed for pain., Disp: 20 tablet, Rfl: 0     insulin glargine (BASAGLAR KWIKPEN) 100 unit/mL (3 mL) pen, Inject 32 Units under the skin daily., Disp: 320 mL, Rfl: 0     lidocaine (LMX) 4 % cream, Apply topically., Disp: , Rfl:      lisinopriL (PRINIVIL,ZESTRIL) 10 MG tablet, TAKE 1 TABLET(10 MG) BY MOUTH AT BEDTIME, Disp: , Rfl:      multivit-min-iron-folic-vit K1 (CENTRUM CHEWABLES) 8 mg-400 mcg- 10 mcg Chew, Chew 1 tablet daily., Disp: , Rfl:      naloxone (NARCAN) 4 mg/actuation nasal spray, 1 spray by Intranasal route as needed for opioid reversal. 1 spray (4 mg dose) into one nostril for opioid reversal. Call 911. May repeat if no response in 3 minutes., Disp: , Rfl:      oxyCODONE-acetaminophen (PERCOCET/ENDOCET) 5-325 mg per tablet, Take 1 tablet by mouth every 6 (six) hours as needed., Disp: 13 tablet, Rfl: 0     pen needle, diabetic (BD ULTRA-FINE ORIG PEN NEEDLE) 29 gauge x 1/2\" Ndle, Use 4 times daily, Disp: , Rfl:      senna-docusate (SENNOSIDES-DOCUSATE SODIUM) 8.6-50 mg tablet, Take 1 tablet by mouth 2 (two) times a day., Disp: , Rfl:      simethicone (MYLICON) 80 MG chewable tablet, Chew 1 tablet (80 mg total) every 6 (six) hours as needed for flatulence., Disp: 30 tablet, Rfl: 0     SUMAtriptan (IMITREX) 25 MG tablet, Take 25 mg by mouth every 2 (two) hours as needed for migraine (Maximum 200 mg per 24 hours). , Disp: , Rfl:      tolterodine (DETROL LA) 2 MG ER capsule, Take 2 mg by mouth daily., Disp: , Rfl:     Plan:labs, dietitian, discussed pursuing restorative sleep and stress management.    Return in about 1 year (around 12/31/2021).    Bariatric Surgery Review     Interim History/LifeChanges: Doing well. Maintaining 169# weight loss. 38# weight loss from last visit. Sugars are well controlled. She is on 34U glargine, Trulicity 1.5mg/wk, taking her vitamins with consistency.    Patient " Concerns: labs, vitamin clarification  Appetite (1-10): OK  GERD: on Pepcid    Medication changes: lower insulin with weight loss    Vitamin Intake:   B-12   monthly injections   MVI  2-3/d   Vitamin D  ?  dose   Calcium        Other                LABS: ordered      Nausea no  Vomiting now and then  Constipation managed  Diarrhea no  Rashes yes sore and hurt under the pannus  Hair Loss yes  Calf tenderness no  Breathing difficulty no  Reactive Hypoglycemia would have-tries to avoid  Light Headedness no   Moods stable    12 point ROS as above and otherwise negative      Habits:  Alcohol: no  Tobacco: no  Caffeine no  NSAIDS asa on famotidine  Exercise Routine: walks with her son around [place]. Has an exercise room  3 meals/day yes  Protein first no  ? grams/day  Water Separate from meals yes  Calorie Containing Beverages grape juice  Restaurant eating/wk sometimes  Sleeping never OK  Stress high  CPAP: NA  Contraception: hyst  DEXA:not indicated    Social History     Social History     Socioeconomic History     Marital status: Single     Spouse name: Not on file     Number of children: Not on file     Years of education: Not on file     Highest education level: Not on file   Occupational History     Not on file   Social Needs     Financial resource strain: Not on file     Food insecurity     Worry: Not on file     Inability: Not on file     Transportation needs     Medical: Not on file     Non-medical: Not on file   Tobacco Use     Smoking status: Never Smoker     Smokeless tobacco: Never Used   Substance and Sexual Activity     Alcohol use: No     Drug use: No     Sexual activity: Not Currently   Lifestyle     Physical activity     Days per week: Not on file     Minutes per session: Not on file     Stress: Not on file   Relationships     Social connections     Talks on phone: Not on file     Gets together: Not on file     Attends Orthodox service: Not on file     Active member of club or organization: Not on  file     Attends meetings of clubs or organizations: Not on file     Relationship status: Not on file     Intimate partner violence     Fear of current or ex partner: Not on file     Emotionally abused: Not on file     Physically abused: Not on file     Forced sexual activity: Not on file   Other Topics Concern     Not on file   Social History Narrative     Not on file       Past Medical History     Past Medical History:   Diagnosis Date     Abnormal cervical Papanicolaou smear with positive human papillomavirus deoxyribonucleic acid test      Ankle joint pain     bilateral     Anxiety      Arthritis     rheumatoid arthritis     Asthma      Carpal tunnel syndrome     bilateral     Compression neuropathy      Depression      Diabetes (H)     type II     Diabetic nephropathy (H)      DVT of lower extremity, bilateral (H) 2011    right     Esophageal reflux      History of blood clots      Hyperlipidemia      Hypertension      Infectious fasciitis 2011    abdominal wall      Insomnia      Neck pain      Necrotizing fasciitis (H)      Neuropathy      Obesity      Pulmonary embolism and infarction (H)      SOB (shortness of breath)     with exertion     Tarsal tunnel syndrome      Urinary incontinence      Problem List     Patient Active Problem List   Diagnosis     Carpal tunnel syndrome of right wrist     Asthma     Depression     DVT of lower extremity, bilateral (H)     Diabetes (H)     Hypertension     Diabetic nephropathy (H)     Neuropathy     Neck pain     Obesity     Compression neuropathy of left lower extremity     Mononeuritis lower limb, left     Tarsal tunnel syndrome of left side     Interdigital neuroma of left foot     Nausea & vomiting     Gastroenteritis     Generalized abdominal pain     Chest wall pain     Morbid obesity with BMI of 45.0-49.9, adult (H)     Influenza A     Hernia     GERD (gastroesophageal reflux disease)     Chronic pain     Diarrhea     Hernia of anterior abdominal wall      Pulmonary embolism, bilateral (H)     Syncope, near     Abnormal Pap smear of cervix     Bilateral ankle joint pain     Chronic pain disorder     Copper deficiency     Chronic pain of both shoulders     History of hysterectomy for benign disease     Bariatric surgery status     History of cholecystectomy     History of injury     History of pulmonary embolism     Insomnia     Hyperlipidemia     Microalbuminuria     Moderate major depression (H)     Moderate persistent asthma without complication     Necrotizing fasciitis (H)     Open wound of groin with complication     Osteoarthritis of both knees     Pain in left lower leg     Persons encountering health services in other specified circumstances     Urinary incontinence     Pulmonary embolism with infarction (H)     Polyneuropathy due to secondary diabetes mellitus (H)     Hx of gastric bypass     Chest pain     Chest pain, unspecified     Hyperglycemia     Neck mass     Hyperglycemia due to type 2 diabetes mellitus (H)     Type 2 diabetes mellitus with diabetic polyneuropathy (H)     Chronic kidney disease, stage 3     Medications     Current Outpatient Medications   Medication Sig Note     ACCU-CHEK FASTCLIX LANCET DRUM USE TO TEST BLOOD SUGARS QID      ACCU-CHEK GUIDE GLUCOSE METER Misc U UTD      ACCU-CHEK GUIDE strips CHECK GLUCOSE TID      albuterol (PROAIR HFA;PROVENTIL HFA;VENTOLIN HFA) 90 mcg/actuation inhaler Inhale 2 puffs every 4 (four) hours as needed for shortness of breath.      amitriptyline (ELAVIL) 10 MG tablet Take 10 mg by mouth at bedtime.      atorvastatin (LIPITOR) 10 MG tablet Take 10 mg by mouth daily.             cholecalciferol, vitamin D3, 1,000 unit tablet Take 2,000 Units by mouth daily.      copper gluconate 2 mg cap Take 1 capsule by mouth daily.      cyanocobalamin 1,000 mcg/mL injection Inject 1,000 mcg into the shoulder, thigh, or buttocks every 28 days.      diclofenac sodium (VOLTAREN) 1 % Gel Apply topically.      dulaglutide  "(TRULICITY) 1.5 mg/0.5 mL PnIj Inject 1.5 mg under the skin every 7 days. Mondays       DULoxetine (CYMBALTA) 60 MG capsule Take 60 mg by mouth daily. 8/21/2019: Patient states she stopped taking, but also said this 1/4/19 at [facility], and patient keeps getting refilled.     famotidine (PEPCID) 20 MG tablet Take 20 mg by mouth 2 (two) times a day.      ferrous sulfate 325 (65 FE) MG EC tablet Take 325 mg by mouth daily.       fluticasone-salmeterol (ADVAIR HFA) 230-21 mcg/actuation inhaler Inhale 2 puffs every evening.        9/8/2019: Prescribed to be taken twice daily.      furosemide (LASIX) 20 MG tablet Take 20 mg by mouth daily as needed for other. swelling      gabapentin (NEURONTIN) 250 mg/5 mL solution Take 750 mg by mouth 3 (three) times a day.      HYDROmorphone (DILAUDID) 2 MG tablet Take 1-2 tablets (2-4 mg total) by mouth every 6 (six) hours as needed for pain.      insulin glargine (BASAGLAR KWIKPEN) 100 unit/mL (3 mL) pen Inject 32 Units under the skin daily.      lidocaine (LMX) 4 % cream Apply topically.      lisinopriL (PRINIVIL,ZESTRIL) 10 MG tablet TAKE 1 TABLET(10 MG) BY MOUTH AT BEDTIME      multivit-min-iron-folic-vit K1 (CENTRUM CHEWABLES) 8 mg-400 mcg- 10 mcg Chew Chew 1 tablet daily.      naloxone (NARCAN) 4 mg/actuation nasal spray 1 spray by Intranasal route as needed for opioid reversal. 1 spray (4 mg dose) into one nostril for opioid reversal. Call 911. May repeat if no response in 3 minutes.      oxyCODONE-acetaminophen (PERCOCET/ENDOCET) 5-325 mg per tablet Take 1 tablet by mouth every 6 (six) hours as needed.      pen needle, diabetic (BD ULTRA-FINE ORIG PEN NEEDLE) 29 gauge x 1/2\" Ndle Use 4 times daily      senna-docusate (SENNOSIDES-DOCUSATE SODIUM) 8.6-50 mg tablet Take 1 tablet by mouth 2 (two) times a day.      simethicone (MYLICON) 80 MG chewable tablet Chew 1 tablet (80 mg total) every 6 (six) hours as needed for flatulence.      SUMAtriptan (IMITREX) 25 MG tablet Take 25 " "mg by mouth every 2 (two) hours as needed for migraine (Maximum 200 mg per 24 hours).       tolterodine (DETROL LA) 2 MG ER capsule Take 2 mg by mouth daily.      Surgical History     Past Surgical History  She has a past surgical history that includes Gastric bypass ();  section; Uterine fibroid surgery (); Abdominal wall surgery; pr revise median n/carpal tunnel surg (Right, 10/24/2014); Hysterectomy; Uterine fibroid surgery; Knee surgery (Right); Colonoscopy (N/A, 3/15/2017); and Colonoscopy (N/A, 3/16/2017).    Objective-Exam     Constitutional:  Ht 4' 11\" (1.499 m)   Wt 191 lb (86.6 kg)   LMP 2005   BMI 38.58 kg/m    Height: 4' 11\" (1.499 m) (2020  9:00 AM)  Initial Weight: 229 lbs (2020  9:00 AM)  Weight: 191 lb (86.6 kg) (2020  9:00 AM)  Weight loss from initial: 38 (2020  9:00 AM)  % Weight loss: 16.59 % (2020  9:00 AM)  BMI (Calculated): 38.6 (2020  9:00 AM)  SpO2: 98 % (12/3/2020 10:01 PM)    General:  Pleasant and in no acute distress   Psychiatric: alert and oriented X3, mood and affect normal    Counseling     We reviewed the important post op bariatric recommendations:  -eating 3 meals daily  -eating protein first, getting >60gm protein daily  -eating slowly, chewing food well  -avoiding/limiting calorie containing beverages  -drinking water 15-30 minutes before or after meals  -choosing wheat, not white with breads, crackers, pastas, aleisha, bagels, tortillas, rice  -limiting restaurant or cafeteria eating to twice a week or less    We discussed the importance of restorative sleep and stress management in maintaining a healthy weight.  We discussed the National Weight Control Registry healthy weight maintenance strategies and ways to optimize metabolism.  We discussed the importance of physical activity including cardiovascular and strength training in maintaining a healthier weight.    We discussed the importance of life-long vitamin " supplementation and life-long  follow-up.    Ruthy was reminded that, to avoid marginal ulcers she should avoid tobacco at all, alcohol in excess, caffeine in excess, and NSAIDS (unless indicated for cardioprotection or othewise and opposed by a PPI).    Vy Carson MD, [facility].  12/31/2020  9:39 AM  Phone call duration: 30 minutes    Vy Carson MD     car

## 2021-07-27 ENCOUNTER — APPOINTMENT (OUTPATIENT)
Dept: INTERNAL MEDICINE | Facility: CLINIC | Age: 26
End: 2021-07-27

## 2021-07-27 DIAGNOSIS — Z78.9 OTHER SPECIFIED HEALTH STATUS: ICD-10-CM

## 2023-01-25 ENCOUNTER — APPOINTMENT (OUTPATIENT)
Dept: INTERNAL MEDICINE | Facility: CLINIC | Age: 28
End: 2023-01-25
Payer: MEDICAID

## 2023-01-25 ENCOUNTER — NON-APPOINTMENT (OUTPATIENT)
Age: 28
End: 2023-01-25

## 2023-01-25 VITALS
HEART RATE: 88 BPM | SYSTOLIC BLOOD PRESSURE: 110 MMHG | TEMPERATURE: 96.1 F | OXYGEN SATURATION: 96 % | WEIGHT: 110 LBS | RESPIRATION RATE: 16 BRPM | DIASTOLIC BLOOD PRESSURE: 77 MMHG | HEIGHT: 58 IN | BODY MASS INDEX: 23.09 KG/M2

## 2023-01-25 DIAGNOSIS — Z00.00 ENCOUNTER FOR GENERAL ADULT MEDICAL EXAMINATION W/OUT ABNORMAL FINDINGS: ICD-10-CM

## 2023-01-25 DIAGNOSIS — E03.8 OTHER SPECIFIED HYPOTHYROIDISM: ICD-10-CM

## 2023-01-25 PROBLEM — Z78.9 NO PERTINENT PAST MEDICAL HISTORY: Status: RESOLVED | Noted: 2023-01-25 | Resolved: 2023-01-25

## 2023-01-25 PROCEDURE — 99213 OFFICE O/P EST LOW 20 MIN: CPT

## 2023-01-28 ENCOUNTER — NON-APPOINTMENT (OUTPATIENT)
Age: 28
End: 2023-01-28

## 2023-01-28 DIAGNOSIS — E55.9 VITAMIN D DEFICIENCY, UNSPECIFIED: ICD-10-CM

## 2023-01-28 LAB
25(OH)D3 SERPL-MCNC: 18 NG/ML
ALBUMIN SERPL ELPH-MCNC: 4.5 G/DL
ALP BLD-CCNC: 68 U/L
ALT SERPL-CCNC: 13 U/L
ANION GAP SERPL CALC-SCNC: 10 MMOL/L
AST SERPL-CCNC: 18 U/L
BASOPHILS # BLD AUTO: 0.05 K/UL
BASOPHILS NFR BLD AUTO: 0.6 %
BILIRUB SERPL-MCNC: 0.2 MG/DL
BUN SERPL-MCNC: 15 MG/DL
CALCIUM SERPL-MCNC: 9.1 MG/DL
CHLORIDE SERPL-SCNC: 104 MMOL/L
CHOLEST SERPL-MCNC: 134 MG/DL
CO2 SERPL-SCNC: 25 MMOL/L
CREAT SERPL-MCNC: 0.63 MG/DL
EGFR: 122 ML/MIN/1.73M2
EOSINOPHIL # BLD AUTO: 0.13 K/UL
EOSINOPHIL NFR BLD AUTO: 1.5 %
FERRITIN SERPL-MCNC: 11 NG/ML
GLUCOSE SERPL-MCNC: 90 MG/DL
HBV SURFACE AB SER QL: REACTIVE
HCT VFR BLD CALC: 35.4 %
HDLC SERPL-MCNC: 53 MG/DL
HGB BLD-MCNC: 11.4 G/DL
IMM GRANULOCYTES NFR BLD AUTO: 0.3 %
IRON SATN MFR SERPL: 9 %
IRON SERPL-MCNC: 39 UG/DL
LDLC SERPL CALC-MCNC: 75 MG/DL
LYMPHOCYTES # BLD AUTO: 1.82 K/UL
LYMPHOCYTES NFR BLD AUTO: 21.1 %
MAN DIFF?: NORMAL
MCHC RBC-ENTMCNC: 28.9 PG
MCHC RBC-ENTMCNC: 32.2 GM/DL
MCV RBC AUTO: 89.8 FL
MEV IGG FLD QL IA: 30.3 AU/ML
MEV IGG+IGM SER-IMP: POSITIVE
MONOCYTES # BLD AUTO: 0.44 K/UL
MONOCYTES NFR BLD AUTO: 5.1 %
MUV AB SER-ACNC: POSITIVE
MUV IGG SER QL IA: 26.3 AU/ML
NEUTROPHILS # BLD AUTO: 6.14 K/UL
NEUTROPHILS NFR BLD AUTO: 71.4 %
NONHDLC SERPL-MCNC: 80 MG/DL
PLATELET # BLD AUTO: 324 K/UL
POTASSIUM SERPL-SCNC: 4.3 MMOL/L
PROT SERPL-MCNC: 7.7 G/DL
RBC # BLD: 3.94 M/UL
RBC # FLD: 14.4 %
RUBV IGG FLD-ACNC: 1.5 INDEX
RUBV IGG SER-IMP: POSITIVE
SODIUM SERPL-SCNC: 139 MMOL/L
T3FREE SERPL-MCNC: 2.46 PG/ML
T4 FREE SERPL-MCNC: 1.1 NG/DL
THYROGLOB AB SERPL-ACNC: 186 IU/ML
THYROPEROXIDASE AB SERPL IA-ACNC: 88.3 IU/ML
TIBC SERPL-MCNC: 430 UG/DL
TRIGL SERPL-MCNC: 30 MG/DL
TSH SERPL-ACNC: 7.65 UIU/ML
UIBC SERPL-MCNC: 391 UG/DL
WBC # FLD AUTO: 8.61 K/UL

## 2023-01-28 NOTE — HISTORY OF PRESENT ILLNESS
[FreeTextEntry1] : nail changes [de-identified] : 29 yo asymptomatic pt.exc.nails are braking.\par PMH: mild subclinical hypothyroidism during pregnancy,was Rxed with Levothyroxine 25 mcg until the birth of the child,than stopped.\par denies tiredness,dry skin,constipation,cold intolerance.menses reg.\par lsat TSH>12,2 y ago.

## 2023-01-28 NOTE — ASSESSMENT
[FreeTextEntry1] : 27 yo asymptomatic pt with h/o subclinical hypothyroidism during pregnancy Rxed with low dose LT4 25 mcg,stopped after birth.\par PE unremarkable.clinically euthyroid pt.\par labs ,will f/u.

## 2023-01-29 PROBLEM — E55.9 VITAMIN D DEFICIENCY: Status: ACTIVE | Noted: 2023-01-29

## 2023-01-29 LAB — HBV SURFACE AG SER QL: NONREACTIVE

## 2023-01-29 RX ORDER — LEVOTHYROXINE SODIUM 0.03 MG/1
25 TABLET ORAL DAILY
Qty: 90 | Refills: 3 | Status: ACTIVE | COMMUNITY
Start: 2023-01-29 | End: 1900-01-01

## 2023-01-29 RX ORDER — ERGOCALCIFEROL 1.25 MG/1
1.25 MG CAPSULE, LIQUID FILLED ORAL
Qty: 8 | Refills: 2 | Status: ACTIVE | COMMUNITY
Start: 2023-01-29 | End: 1900-01-01

## 2023-02-05 LAB
M TB IFN-G BLD-IMP: NEGATIVE
QUANTIFERON TB PLUS MITOGEN MINUS NIL: 9.72 IU/ML
QUANTIFERON TB PLUS NIL: 0.03 IU/ML
QUANTIFERON TB PLUS TB1 MINUS NIL: 0.01 IU/ML
QUANTIFERON TB PLUS TB2 MINUS NIL: 0.02 IU/ML

## 2023-02-15 ENCOUNTER — NON-APPOINTMENT (OUTPATIENT)
Age: 28
End: 2023-02-15

## 2023-02-28 ENCOUNTER — NON-APPOINTMENT (OUTPATIENT)
Age: 28
End: 2023-02-28

## 2023-03-03 ENCOUNTER — NON-APPOINTMENT (OUTPATIENT)
Age: 28
End: 2023-03-03

## 2023-03-16 LAB
AMPHET UR-MCNC: NEGATIVE NG/ML
BARBITURATES UR-MCNC: NEGATIVE NG/ML
BENZODIAZ UR-MCNC: NEGATIVE NG/ML
COCAINE METAB.OTHER UR-MCNC: NEGATIVE NG/ML
CREATININE, URINE: 48.5 MG/DL
FENTANYL, URINE: NEGATIVE NG/ML
METHADONE UR-MCNC: NEGATIVE NG/ML
OPIATES UR-MCNC: NEGATIVE NG/ML
OXYCODONE/OXYMORPHONE, URINE: NEGATIVE NG/ML
PCP UR-MCNC: NEGATIVE NG/ML
PH, URINE: 5.6
PLEASE NOTE: DRUGSCRUR: NORMAL
THC UR QL: NEGATIVE NG/ML

## 2023-06-13 NOTE — DISCHARGE NOTE OB - IF BREASTFEEDING, ADD A TOTAL OF 500 EXTRA CALORIES EACH DAY
RN called patient and reviewed with him Dr. Ordonez's recommendations. Patient verbalized understanding and is in agreement with plan. RN updated patient's med list to reflect hold on spironolactone. Patient will update our office in a few weeks with his blood pressure readings and if any improvement in symptoms.       Stop spironolactone for now thanks  Dr. Ordonez    Statement Selected

## 2023-07-06 NOTE — DISCHARGE NOTE OB - PATIENT PORTAL LINK FT
“You can access the FollowHealth Patient Portal, offered by [facility], by registering with the following website: [email or web address]” [employer] Pregnancy And Lactation Text: This medication is Pregnancy Category B and is considered safe. There is some data to suggest avoiding during the third trimester. It is unknown if this medication is excreted in breast milk.